# Patient Record
Sex: MALE | Race: WHITE | HISPANIC OR LATINO
[De-identification: names, ages, dates, MRNs, and addresses within clinical notes are randomized per-mention and may not be internally consistent; named-entity substitution may affect disease eponyms.]

---

## 2020-02-18 PROBLEM — Z00.00 ENCOUNTER FOR PREVENTIVE HEALTH EXAMINATION: Status: ACTIVE | Noted: 2020-02-18

## 2020-02-24 ENCOUNTER — APPOINTMENT (OUTPATIENT)
Dept: ORTHOPEDIC SURGERY | Facility: CLINIC | Age: 50
End: 2020-02-24
Payer: COMMERCIAL

## 2020-02-24 VITALS
WEIGHT: 180 LBS | BODY MASS INDEX: 28.93 KG/M2 | HEIGHT: 66 IN | SYSTOLIC BLOOD PRESSURE: 117 MMHG | DIASTOLIC BLOOD PRESSURE: 78 MMHG

## 2020-02-24 PROCEDURE — 99203 OFFICE O/P NEW LOW 30 MIN: CPT

## 2020-02-24 RX ORDER — MELOXICAM 7.5 MG/1
7.5 TABLET ORAL
Qty: 21 | Refills: 0 | Status: COMPLETED | COMMUNITY
Start: 2020-02-24 | End: 2020-03-16

## 2020-02-24 NOTE — PHYSICAL EXAM
[de-identified] : Physical Exam:\par General: Well appearing, no acute distress\par Neurologic: A&Ox3, No focal deficits\par Head: NCAT without abrasions, lacerations, or ecchymosis to head, face, or scalp\par Eyes: No scleral icterus, no gross abnormalities\par Respiratory: Equal chest wall expansion bilaterally, no accessory muscle use\par Lymphatic: No lymphadenopathy palpated\par Skin: Warm and dry\par Psychiatric: Normal mood and affect\par \par Right knee\par \par Inspection/Palpation: Gait evaluation does reveal a limp. There is no inguinal adenopathy. Limb is well-perfused, without skin lesions, shows a grossly normal motor and sensory examination. \par ROM: The Right knee motion is significantly reduced and does cause significant pain. The knee exhibits a moderate effusion. \par Muscle/Nerves: Quad strength decreased secondary to injury. Motor exam 5/ distally, EHL/FHL/GSC/TA\par SILT L4-S1\par Special Tests: \par Joint line tenderness noted medial joint line at 0 and 90 degrees. \par Positive Thessaly. Positive Shyann test. Positive Appley grind test\par Stable in varus and valgus stress at 0 and 30 degrees\par Negative posterior drawer. \par The knee has a negative Lachman and negative anterior drawer.\par Normal hip and ankle exam. \par \par Left Knee\par \par Inspection/Palpation: There is no inguinal adenopathy. Well perfused, without skin lesions, shows a grossly normal motor and sensory examination. \par ROM: Normal\par Muscle/Nerves: Quad strength decreased secondary to injury. Motor exam 5/ distally, EHL/FHL/GSC/TA\par SILT L4-S1\par Special Tests: \par No Joint line tenderness noted  at medial and lateral joint line at 0 and 90 degrees. \par Stable in varus and valgus stress at 0 and 30 degrees\par Negative posterior drawer. \par Negative anterior drawer and stable Lachman \par Normal hip and ankle exam. [de-identified] : MRI to right knee reveals mild MCL sprain, medial meniscus tear extending into medial gutter, medial tibiofemoral cartilage loss and lateral patellar tilt.

## 2020-02-24 NOTE — HISTORY OF PRESENT ILLNESS
[3] : an average pain level of 3/10 [1] : a maximum pain level of 1/10 [Sitting] : worsened by sitting [Walking] : worsened by walking [Rest] : relieved by rest [de-identified] : SAM is a 49 year male who presents today with complaints of right knee pain. He reports pain began approximately one year ago with insidious onset.  He is an [employer]  and reports pain with prolonged sitting.  His PCP ordered an MRI and prescribed Vimovo which patient takes 1-2 x/week PRN.  MRI reveals mild MCL sprain, medial meniscus tear extending into medial gutter, medial tibiofemoral cartilage loss and lateral patellar tilt.  He denies performing physical therapy or receiving an injection.  Patient's pain is located to med. and lat. knee with medial side being worse.  He denies mechanical symptoms.  Patient denies pain with stairs.\par

## 2020-02-24 NOTE — DISCUSSION/SUMMARY
[de-identified] : SAM is a 49 year male who presents today with complaints of right knee pain. He reports pain began approximately one year ago with insidious onset.  He is an [employer]  and reports pain with prolonged sitting.  His PCP ordered an MRI and prescribed Vimovo which patient takes 1-2 x/week PRN.  MRI reveals mild MCL sprain, medial meniscus tear extending into medial gutter, medial tibiofemoral cartilage loss and lateral patellar tilt.  He denies performing physical therapy or receiving an injection.  Patient's pain is located to med. and lat. knee with medial side being worse.  He denies mechanical symptoms.  Patient denies pain with stairs.\par \par After review of pts normal radiographs, MR imaging and his clinical exam I believe his primary complaint to be MCL sprain due to a lack of mechanical symptoms at this time. I believe a trial of conservatives measures is indicated since he denies a history of them.  He will start Mobic x 21 days with food and d/c if gi upset occurs. Pt to perform PT 2x/week x 6-8 weeks and until we see him again. At that time we will see him for clinical reassessment. If he develops mechanical symptoms such as locking/catching/instability we will further discuss operative intervention with arthroscopy to correct the meniscal tear versus cortisone injection. Pt agrees to the above plan and all questions were answered.\par

## 2020-04-20 ENCOUNTER — APPOINTMENT (OUTPATIENT)
Dept: ORTHOPEDIC SURGERY | Facility: CLINIC | Age: 50
End: 2020-04-20

## 2020-09-24 ENCOUNTER — APPOINTMENT (OUTPATIENT)
Dept: ORTHOPEDIC SURGERY | Facility: CLINIC | Age: 50
End: 2020-09-24
Payer: COMMERCIAL

## 2020-09-24 VITALS
DIASTOLIC BLOOD PRESSURE: 83 MMHG | SYSTOLIC BLOOD PRESSURE: 126 MMHG | WEIGHT: 180 LBS | HEART RATE: 61 BPM | BODY MASS INDEX: 28.93 KG/M2 | HEIGHT: 66 IN

## 2020-09-24 DIAGNOSIS — S83.411A SPRAIN OF MEDIAL COLLATERAL LIGAMENT OF RIGHT KNEE, INITIAL ENCOUNTER: ICD-10-CM

## 2020-09-24 DIAGNOSIS — S83.241A OTHER TEAR OF MEDIAL MENISCUS, CURRENT INJURY, RIGHT KNEE, INITIAL ENCOUNTER: ICD-10-CM

## 2020-09-24 PROCEDURE — 20610 DRAIN/INJ JOINT/BURSA W/O US: CPT | Mod: RT

## 2020-09-24 PROCEDURE — 99214 OFFICE O/P EST MOD 30 MIN: CPT | Mod: 25

## 2020-09-24 NOTE — DISCUSSION/SUMMARY
[de-identified] : SAM is a 50 year male who presents today for follow up for right knee pain secondary to MCL sprain and chronic medial meniscus tear. At last evaluation in February he was advised to perform PT.\par \par Currently he reports improvement in his pain with only three sessions of PT. He did not take Mobic as prescribed due to hx of stomach ulcer. He would like to restart PT at this time but admits to his pain being worse than prior to seeing us starting 2 months ago. He currently isnt working due to the pain. He works as a  for [employer] and since this is his driving leg he cannot work due to constant pain. He denies mechanical symptoms at this time and presents in a knee brace that he wears daily.\par \par After review of pts current symptoms and based on his clinical exam today I believe his primary complaint to be MCL sprain due to a lack of mechanical symptoms at this time. I believe a continued trial of conservatives measures are indicated since he had no pain after only 3 sessions of PT alone.  Due to his acute pain he elects for a steroid injection that he tolerated well. Pt to perform PT 2x/week x 6-8 weeks, starting in 1 week from today and until we see him again. At that time we will see him for clinical reassessment. If he develops mechanical symptoms such as locking/catching/instability we will further discuss operative intervention with arthroscopy to correct the meniscal tear.  Pt agrees to the above plan and all questions were answered.\par

## 2020-09-24 NOTE — PHYSICAL EXAM
[de-identified] : Physical Exam:\par General: Well appearing, no acute distress\par Neurologic: A&Ox3, No focal deficits\par Head: NCAT without abrasions, lacerations, or ecchymosis to head, face, or scalp\par Eyes: No scleral icterus, no gross abnormalities\par Respiratory: Equal chest wall expansion bilaterally, no accessory muscle use\par Lymphatic: No lymphadenopathy palpated\par Skin: Warm and dry\par Psychiatric: Normal mood and affect\par \par Right knee\par \par Inspection/Palpation: Gait evaluation does reveal a limp. There is no inguinal adenopathy. Limb is well-perfused, without skin lesions, shows a grossly normal motor and sensory examination. \par ROM: The Right knee motion is significantly reduced and does cause significant pain. The knee exhibits a moderate effusion. \par Muscle/Nerves: Quad strength decreased secondary to injury. Motor exam 5/ distally, EHL/FHL/GSC/TA\par SILT L4-S1\par Special Tests: \par Joint line tenderness noted medial joint line at 0 and 90 degrees. \par Positive Thessaly. Positive Shyann test. Positive Appley grind test\par Stable in varus and valgus stress at 0 and 30 degrees\par Negative posterior drawer. \par The knee has a negative Lachman and negative anterior drawer.\par Normal hip and ankle exam. \par \par Left Knee\par \par Inspection/Palpation: There is no inguinal adenopathy. Well perfused, without skin lesions, shows a grossly normal motor and sensory examination. \par ROM: Normal\par Muscle/Nerves: Quad strength decreased secondary to injury. Motor exam 5/ distally, EHL/FHL/GSC/TA\par SILT L4-S1\par Special Tests: \par No Joint line tenderness noted  at medial and lateral joint line at 0 and 90 degrees. \par Stable in varus and valgus stress at 0 and 30 degrees\par Negative posterior drawer. \par Negative anterior drawer and stable Lachman \par Normal hip and ankle exam.

## 2020-09-24 NOTE — HISTORY OF PRESENT ILLNESS
[de-identified] : SAM is a 50 year male who presents today for follow up for right knee pain secondary to MCL sprain and chronic medial meniscus tear. At last evaluation in February he was advised to perform PT.\par \par Currently he reports improvement in his pain with only three sessions of PT. He did not take Mobic as prescribed due to hx of stomach ulcer. He would like to restart PT at this time but admits to his pain being worse than prior to seeing us starting 2 months ago. He currently isnt working due to the pain. He works as a  for [employer] and since this is his driving leg he cannot work due to constant pain. He denies mechanical symptoms at this time and presents in a knee brace that he wears daily.

## 2020-09-24 NOTE — PROCEDURE
[de-identified] : Injection: Right knee joint.\par Indication: Pain\par \par A discussion was had with the patient regarding this procedure and all questions were answered. All risks, benefits and alternatives were discussed. These included but were not limited to bleeding, infection, and allergic reaction. Alcohol was used to clean the skin, and betadine was used to sterilize and prep the area in the supero-lateral aspect of the right knee. Ethyl chloride spray was then used as a topical anesthetic. A 22-gauge needle was used to inject 3cc of 1% Xylocaine, 2cc of 0.25% Bupivacaine and 1cc of 40mg/mL Triamcinolone Acetonide into the right knee. A sterile bandage was then applied. The patient tolerated the procedure well and there were no complications\par

## 2020-11-02 ENCOUNTER — EMERGENCY (EMERGENCY)
Facility: HOSPITAL | Age: 50
LOS: 0 days | Discharge: HOME | End: 2020-11-02
Payer: COMMERCIAL

## 2020-11-02 VITALS
HEART RATE: 84 BPM | RESPIRATION RATE: 18 BRPM | DIASTOLIC BLOOD PRESSURE: 94 MMHG | OXYGEN SATURATION: 97 % | TEMPERATURE: 98 F | SYSTOLIC BLOOD PRESSURE: 159 MMHG

## 2020-11-02 DIAGNOSIS — R51.9 HEADACHE, UNSPECIFIED: ICD-10-CM

## 2020-11-02 LAB — SARS-COV-2 RNA SPEC QL NAA+PROBE: SIGNIFICANT CHANGE UP

## 2020-11-02 PROCEDURE — 99283 EMERGENCY DEPT VISIT LOW MDM: CPT

## 2020-11-02 PROCEDURE — U0003: CPT

## 2020-11-02 NOTE — ED ADULT NURSE NOTE - OBJECTIVE STATEMENT
PT to ED for COVID Testing.c/o headache Unknown exposure. PT evaluated by PA. Verbal consent for email/text results given. Verbalized understanding of COVID d/c instructions.

## 2020-11-02 NOTE — ED ADULT TRIAGE NOTE - CHIEF COMPLAINT QUOTE
PT to ED for COVID Testing. c/o headache. Unknown exposure. PT evaluated by PA. Verbal consent for email/text results given. Verbalized understanding of COVID d/c instructions.

## 2020-11-02 NOTE — ED STATDOCS - PATIENT PORTAL LINK FT
You can access the FollowMyHealth Patient Portal offered by [facility] by registering at the following website: [email or web address]. By joining [employer]’s FollowMyHealth portal, you will also be able to view your health information using other applications (apps) compatible with our system.

## 2021-02-22 ENCOUNTER — OUTPATIENT (OUTPATIENT)
Dept: OUTPATIENT SERVICES | Facility: HOSPITAL | Age: 51
LOS: 1 days | End: 2021-02-22
Payer: COMMERCIAL

## 2021-02-22 DIAGNOSIS — Z20.828 CONTACT WITH AND (SUSPECTED) EXPOSURE TO OTHER VIRAL COMMUNICABLE DISEASES: ICD-10-CM

## 2021-02-22 PROBLEM — Z78.9 OTHER SPECIFIED HEALTH STATUS: Chronic | Status: ACTIVE | Noted: 2020-11-02

## 2021-02-22 LAB — SARS-COV-2 RNA SPEC QL NAA+PROBE: SIGNIFICANT CHANGE UP

## 2021-02-22 PROCEDURE — C9803: CPT

## 2021-02-22 PROCEDURE — U0005: CPT

## 2021-02-22 PROCEDURE — U0003: CPT

## 2021-02-23 DIAGNOSIS — Z20.828 CONTACT WITH AND (SUSPECTED) EXPOSURE TO OTHER VIRAL COMMUNICABLE DISEASES: ICD-10-CM

## 2023-07-08 ENCOUNTER — EMERGENCY (EMERGENCY)
Facility: HOSPITAL | Age: 53
LOS: 1 days | Discharge: HOME | End: 2023-07-08
Attending: EMERGENCY MEDICINE
Payer: COMMERCIAL

## 2023-07-08 VITALS
SYSTOLIC BLOOD PRESSURE: 108 MMHG | RESPIRATION RATE: 18 BRPM | TEMPERATURE: 99 F | OXYGEN SATURATION: 95 % | HEART RATE: 95 BPM | DIASTOLIC BLOOD PRESSURE: 71 MMHG

## 2023-07-08 VITALS
DIASTOLIC BLOOD PRESSURE: 89 MMHG | HEIGHT: 65 IN | OXYGEN SATURATION: 96 % | RESPIRATION RATE: 18 BRPM | HEART RATE: 107 BPM | WEIGHT: 179.9 LBS | SYSTOLIC BLOOD PRESSURE: 126 MMHG | TEMPERATURE: 98 F

## 2023-07-08 PROCEDURE — 96375 TX/PRO/DX INJ NEW DRUG ADDON: CPT

## 2023-07-08 PROCEDURE — 99284 EMERGENCY DEPT VISIT MOD MDM: CPT | Mod: 25

## 2023-07-08 PROCEDURE — 99284 EMERGENCY DEPT VISIT MOD MDM: CPT

## 2023-07-08 PROCEDURE — 96374 THER/PROPH/DIAG INJ IV PUSH: CPT

## 2023-07-08 RX ORDER — FAMOTIDINE 10 MG/ML
20 INJECTION INTRAVENOUS ONCE
Refills: 0 | Status: COMPLETED | OUTPATIENT
Start: 2023-07-08 | End: 2023-07-08

## 2023-07-08 RX ORDER — SODIUM CHLORIDE 9 MG/ML
1000 INJECTION INTRAMUSCULAR; INTRAVENOUS; SUBCUTANEOUS ONCE
Refills: 0 | Status: COMPLETED | OUTPATIENT
Start: 2023-07-08 | End: 2023-07-08

## 2023-07-08 RX ORDER — DIPHENHYDRAMINE HCL 50 MG
50 CAPSULE ORAL ONCE
Refills: 0 | Status: COMPLETED | OUTPATIENT
Start: 2023-07-08 | End: 2023-07-08

## 2023-07-08 RX ORDER — FAMOTIDINE 10 MG/ML
1 INJECTION INTRAVENOUS
Qty: 7 | Refills: 0
Start: 2023-07-08 | End: 2023-07-14

## 2023-07-08 RX ADMIN — Medication 50 MILLIGRAM(S): at 10:45

## 2023-07-08 RX ADMIN — FAMOTIDINE 100 MILLIGRAM(S): 10 INJECTION INTRAVENOUS at 10:45

## 2023-07-08 RX ADMIN — SODIUM CHLORIDE 1000 MILLILITER(S): 9 INJECTION INTRAMUSCULAR; INTRAVENOUS; SUBCUTANEOUS at 10:46

## 2023-07-08 RX ADMIN — Medication 125 MILLIGRAM(S): at 10:45

## 2023-07-08 NOTE — ED PROVIDER NOTE - OBJECTIVE STATEMENT
52-year-old male presents to ED complaining of rash to his face chest arm and lower extremity x2 days.  Patient denies eating or drinking anything new.  Patient also denies any new prescribed medication, detergent, deodorant, cologne or creams.  Patient admits to doing some yard work around his house but denies coming in contact with any poison ivy.  Patient explained that he went to the pharmacy and purchase over-the-counter Benadryl and cortisone cream which he has been using but has done nothing to improve his symptoms.  Patient denies any shortness of breath, difficulty breathing and difficulty swallowing eating or drinking.  Patient denies any significant past medical history allergies to medication or food.

## 2023-07-08 NOTE — ED ADULT NURSE NOTE - OBJECTIVE STATEMENT
pt a&ox3, vss, c/o generalized rash throughout body x3, pt believe sit is poison ivy , respirations even and unlabored, pt in NAD @ this time, POC discussed w. patient, will continue to reassess

## 2023-07-08 NOTE — ED PROVIDER NOTE - NSFOLLOWUPINSTRUCTIONS_ED_ALL_ED_FT
Continue medication as prescribed.  Follow-up with primary care physician or [facility].

## 2023-07-08 NOTE — ED PROVIDER NOTE - PROGRESS NOTE DETAILS
Patient tolerated treatment in the ED.  Patient feels a lot better.  Patient DC in stable condition with Rx sent to patient's pharmacy.

## 2023-07-08 NOTE — ED PROVIDER NOTE - ATTENDING APP SHARED VISIT CONTRIBUTION OF CARE
The patient discussed with ACP    Rojas DUBOIS, Alex Irene, performed the initial face to face bedside interview with this patient regarding history of present illness, review of symptoms and relevant past medical, social and family history.  I completed an independent physical examination.  I was the initial provider who evaluated this patient. I have signed out the follow up of any pending tests (i.e. labs, radiological studies) to the ACP.  I have communicated the patient’s plan of care and disposition with the ACP.

## 2023-07-08 NOTE — ED PROVIDER NOTE - PATIENT PORTAL LINK FT
You can access the FollowMyHealth Patient Portal offered by [facility] by registering at the following website: [email or web address]. By joining [employer]’s FollowMyHealth portal, you will also be able to view your health information using other applications (apps) compatible with our system.

## 2023-07-08 NOTE — ED PROVIDER NOTE - CLINICAL SUMMARY MEDICAL DECISION MAKING FREE TEXT BOX
52-year-old male presents to ED complaining of rash to his face chest arm and lower extremity x2 days.  Patient denies eating or drinking anything new.  Patient also denies any new prescribed medication, detergent, deodorant, cologne or creams.  Patient admits to doing some yard work around his house but denies coming in contact with any poison ivy.  Patient explained that he went to the pharmacy and purchase over-the-counter Benadryl and cortisone cream which he has been using but has done nothing to improve his symptoms. HEENT: Normal findings, Eyes : PERRLA, EOMI , Nore saskia and Throat : WNL  Lungs: Clear B/L with good air entry  CVS: S1-S2 , with no murmurs  Abd : Normal BS, with no tenderness or organomegaly  Skin: Hives noted to scalp , face and extremities   Ext: Normal findings  Pt treated with benadryl, Pepcid and Solumedrol and Re-evaluate.

## 2023-07-11 ENCOUNTER — EMERGENCY (EMERGENCY)
Facility: HOSPITAL | Age: 53
LOS: 1 days | Discharge: HOME | End: 2023-07-11
Attending: EMERGENCY MEDICINE
Payer: COMMERCIAL

## 2023-07-11 VITALS
HEART RATE: 81 BPM | HEIGHT: 65 IN | SYSTOLIC BLOOD PRESSURE: 152 MMHG | WEIGHT: 196.21 LBS | TEMPERATURE: 98 F | OXYGEN SATURATION: 98 % | DIASTOLIC BLOOD PRESSURE: 94 MMHG | RESPIRATION RATE: 20 BRPM

## 2023-07-11 PROCEDURE — 99283 EMERGENCY DEPT VISIT LOW MDM: CPT

## 2023-07-11 PROCEDURE — 99284 EMERGENCY DEPT VISIT MOD MDM: CPT

## 2023-07-11 NOTE — ED STATDOCS - NSFOLLOWUPINSTRUCTIONS_ED_ALL_ED_FT
use triamcinolone cream up to 3 times a day as prescribed.  Finish prednisone and famotidine as previously prescribed.  Return to emergency department for reevaluation if symptoms worsening.  Otherwise follow-up with PMD later in the week for reevaluation.

## 2023-07-11 NOTE — ED ADULT TRIAGE NOTE - CHIEF COMPLAINT QUOTE
Patient C/O hives due to poison Ivy exposure, seen 4 days ago at [facility] for same, resp even/unlabored.

## 2023-07-11 NOTE — ED STATDOCS - PHYSICAL EXAMINATION
Nontoxic-appearing, no acute distress, urticarial rash to chest and abdomen,  excoriated papules to bilateral forearms without secondary signs of infection.

## 2023-07-11 NOTE — ED STATDOCS - OBJECTIVE STATEMENT
Itchy rash since last Wednesday after exposure to poison ivy.  Seen in the emergency department and prescribed prednisone taper with famotidine.  Currently taking 20 mg prednisone; reports that rash started up again today.  Reports itch.  Denies fever.  Denies prior dermatologic problems.

## 2023-11-02 NOTE — ED ADULT NURSE NOTE - NSFALLUNIVINTERV_ED_ALL_ED
No Bed/Stretcher in lowest position, wheels locked, appropriate side rails in place/Call bell, personal items and telephone in reach/Instruct patient to call for assistance before getting out of bed/chair/stretcher/Non-slip footwear applied when patient is off stretcher/[place] to call system/Physically safe environment - no spills, clutter or unnecessary equipment/Purposeful proactive rounding/Room/bathroom lighting operational, light cord in reach

## 2025-07-15 ENCOUNTER — EMERGENCY (EMERGENCY)
Facility: HOSPITAL | Age: 55
LOS: 1 days | End: 2025-07-15
Attending: STUDENT IN AN ORGANIZED HEALTH CARE EDUCATION/TRAINING PROGRAM
Payer: COMMERCIAL

## 2025-07-15 VITALS
OXYGEN SATURATION: 97 % | RESPIRATION RATE: 18 BRPM | SYSTOLIC BLOOD PRESSURE: 164 MMHG | HEART RATE: 86 BPM | TEMPERATURE: 98 F | DIASTOLIC BLOOD PRESSURE: 105 MMHG | WEIGHT: 195.11 LBS | HEIGHT: 62 IN

## 2025-07-15 PROCEDURE — 99283 EMERGENCY DEPT VISIT LOW MDM: CPT

## 2025-07-15 PROCEDURE — 99284 EMERGENCY DEPT VISIT MOD MDM: CPT

## 2025-07-15 RX ADMIN — Medication 1 APPLICATION(S): at 12:20

## 2025-07-15 NOTE — ED PROVIDER NOTE - OBJECTIVE STATEMENT
54 year old male with no significant past medical history presents today for itchy rash on right shin. He states rash started 8 days ago after doing yard work. He has been treating at home with oral and topical benadryl with no relief of symptoms. Denies fever, chills, weight loss. Denies cough, dyspnea, wheezing. Denies NVD.

## 2025-07-15 NOTE — ED PROVIDER NOTE - PATIENT PORTAL LINK FT
none
You can access the FollowMyHealth Patient Portal offered by [facility] by registering at the following website: [email or web address]. By joining [employer]’s FollowMyHealth portal, you will also be able to view your health information using other applications (apps) compatible with our system.

## 2025-07-15 NOTE — ED ADULT NURSE NOTE - OBJECTIVE STATEMENT
Pt is a 54y M, AOx4, presenting to [facility] w/rash of RLE c/o itching. Pt states he was working out in his yard x10 days ago, rash began 8 days ago. Pt endorses mild eye swelling 4 days ago, states he has had rash like this before w/eye sx after working outdoors. Denies fevers or any other sx. Denies significant PMH. Resp even and unlabored. Bed locked and in lowest position.

## 2025-07-15 NOTE — ED PROVIDER NOTE - CLINICAL SUMMARY MEDICAL DECISION MAKING FREE TEXT BOX
54 year old male with no significant past medical history presents today for itchy rash on right shin. in setting of yardwork and appearance, likely consistent with contact dermatitis likely poison ivy, start on topical steroid, stable for dc, f/u with pcp

## 2025-07-15 NOTE — ED PROVIDER NOTE - ATTENDING APP SHARED VISIT CONTRIBUTION OF CARE
I have personally seen the patient with the PA. I agree with their assessment and plan unless otherwise noted. See below:    Patient is a 54-year-old male who presents emergency department for 8 days of rash to his right leg.  Patient states 12 days ago he was working in his yard, cleaning things.  Denies trauma.     General: well appearing, no acute distress, appropriate weight  Cardiac: heart RRR, no murmurs, rubs, gallops, pulses 2+ x4  Pulm: lungs CTA  Neuro: A&Ox3, sensation intact, strength 5/5  Skin: warm, dry, no laceration, abrasion, contusion. erythematous rash to R ankle leg, ankle and foot consistent with contact dermatitis  MSK: FROM of all extremities    will medicate and have FU with PCP.

## 2025-07-15 NOTE — ED PROVIDER NOTE - PHYSICAL EXAMINATION
Gen: NAD, AOx3  Head: NCAT  HEENT: EOMI, oral mucosa moist, normal conjunctiva, neck supple  MSK: No edema, no visible deformities  Neuro: No focal neurologic deficits  Skin: blistering rash with excoriations over right shin   Psych: normal affect Gen: NAD, AOx3  Head: NCAT  HEENT: EOMI, oral mucosa moist, normal conjunctiva, neck supple  MSK: No edema, no visible deformities  Neuro: No focal neurologic deficits  Skin: blistering rash with excoriations over right shin   Msk: FROM of the lower extremities  Pulses- 2+ DP  Psych: normal affect